# Patient Record
Sex: FEMALE | Race: BLACK OR AFRICAN AMERICAN | NOT HISPANIC OR LATINO | Employment: OTHER | URBAN - METROPOLITAN AREA
[De-identification: names, ages, dates, MRNs, and addresses within clinical notes are randomized per-mention and may not be internally consistent; named-entity substitution may affect disease eponyms.]

---

## 2019-05-15 PROBLEM — E55.9 VITAMIN D DEFICIENCY: Status: ACTIVE | Noted: 2018-04-13

## 2019-05-15 PROBLEM — I51.9 DYSFUNCTION OF RIGHT CARDIAC VENTRICLE: Status: ACTIVE | Noted: 2019-05-15

## 2019-05-15 PROBLEM — E66.01 MORBID OBESITY DUE TO EXCESS CALORIES: Status: ACTIVE | Noted: 2017-08-30

## 2019-05-15 PROBLEM — S43.422A SPRAIN OF LEFT ROTATOR CUFF CAPSULE: Status: ACTIVE | Noted: 2019-05-15

## 2019-05-15 PROBLEM — E78.2 MIXED HYPERLIPIDEMIA: Status: ACTIVE | Noted: 2018-03-28

## 2019-05-15 PROBLEM — E11.9 TYPE 2 DIABETES MELLITUS WITHOUT COMPLICATION: Status: ACTIVE | Noted: 2017-08-30

## 2019-05-15 PROBLEM — M79.7 FIBROMYALGIA: Status: ACTIVE | Noted: 2017-08-30

## 2019-05-15 PROBLEM — F31.81 BIPOLAR 2 DISORDER, MAJOR DEPRESSIVE EPISODE: Status: ACTIVE | Noted: 2018-03-28

## 2019-05-15 PROBLEM — I10 ESSENTIAL HYPERTENSION: Status: ACTIVE | Noted: 2017-08-30

## 2019-05-15 PROBLEM — M79.7 FIBROMYALGIA: Chronic | Status: ACTIVE | Noted: 2017-08-30

## 2019-05-16 PROBLEM — F31.81 BIPOLAR 2 DISORDER, MAJOR DEPRESSIVE EPISODE: Chronic | Status: ACTIVE | Noted: 2018-03-28

## 2019-05-16 PROBLEM — I51.9 DYSFUNCTION OF RIGHT CARDIAC VENTRICLE: Chronic | Status: ACTIVE | Noted: 2019-05-15

## 2019-05-16 PROBLEM — E11.9 TYPE 2 DIABETES MELLITUS WITHOUT COMPLICATION: Chronic | Status: ACTIVE | Noted: 2017-08-30

## 2019-05-16 PROBLEM — E66.01 MORBID OBESITY DUE TO EXCESS CALORIES: Chronic | Status: ACTIVE | Noted: 2017-08-30

## 2019-05-16 PROBLEM — I10 ESSENTIAL HYPERTENSION: Chronic | Status: ACTIVE | Noted: 2017-08-30

## 2019-05-16 PROBLEM — E78.2 MIXED HYPERLIPIDEMIA: Chronic | Status: ACTIVE | Noted: 2018-03-28

## 2019-05-16 PROBLEM — E55.9 VITAMIN D DEFICIENCY: Chronic | Status: ACTIVE | Noted: 2018-04-13

## 2019-08-29 PROBLEM — J20.9 BRONCHITIS WITH BRONCHOSPASM: Status: ACTIVE | Noted: 2019-08-29

## 2019-10-09 PROBLEM — Z79.4 TYPE 2 DIABETES MELLITUS WITHOUT COMPLICATION, WITH LONG-TERM CURRENT USE OF INSULIN: Status: ACTIVE | Noted: 2017-08-30

## 2019-12-04 PROBLEM — E11.40 TYPE 2 DIABETES MELLITUS WITH DIABETIC NEUROPATHY, WITH LONG-TERM CURRENT USE OF INSULIN: Status: ACTIVE | Noted: 2017-08-30

## 2020-02-25 PROBLEM — G47.33 OBSTRUCTIVE SLEEP APNEA: Status: ACTIVE | Noted: 2020-02-25

## 2020-02-27 PROBLEM — F60.3 BORDERLINE PERSONALITY DISORDER: Status: ACTIVE | Noted: 2020-02-27

## 2020-02-28 PROBLEM — N39.0 UTI (URINARY TRACT INFECTION): Status: ACTIVE | Noted: 2020-02-28

## 2020-03-18 PROBLEM — S43.422A SPRAIN OF LEFT ROTATOR CUFF CAPSULE: Chronic | Status: ACTIVE | Noted: 2019-05-15

## 2020-03-18 PROBLEM — E11.40 TYPE 2 DIABETES MELLITUS WITH DIABETIC NEUROPATHY, WITH LONG-TERM CURRENT USE OF INSULIN: Chronic | Status: ACTIVE | Noted: 2017-08-30

## 2020-03-18 PROBLEM — G47.33 OBSTRUCTIVE SLEEP APNEA: Chronic | Status: ACTIVE | Noted: 2020-02-25

## 2020-03-18 PROBLEM — Z79.4 TYPE 2 DIABETES MELLITUS WITH DIABETIC NEUROPATHY, WITH LONG-TERM CURRENT USE OF INSULIN: Chronic | Status: ACTIVE | Noted: 2017-08-30

## 2020-03-18 PROBLEM — F60.3 BORDERLINE PERSONALITY DISORDER: Chronic | Status: ACTIVE | Noted: 2020-02-27

## 2020-03-18 PROBLEM — J20.9 BRONCHITIS WITH BRONCHOSPASM: Chronic | Status: ACTIVE | Noted: 2019-08-29

## 2020-04-29 PROBLEM — M12.812 ROTATOR CUFF ARTHROPATHY OF BOTH SHOULDERS: Chronic | Status: ACTIVE | Noted: 2020-04-29

## 2020-04-29 PROBLEM — M12.811 ROTATOR CUFF ARTHROPATHY OF BOTH SHOULDERS: Chronic | Status: ACTIVE | Noted: 2020-04-29

## 2020-04-29 PROBLEM — S43.422A SPRAIN OF LEFT ROTATOR CUFF CAPSULE: Chronic | Status: RESOLVED | Noted: 2019-05-15 | Resolved: 2020-04-29

## 2020-09-23 PROBLEM — G47.09 OTHER INSOMNIA: Status: ACTIVE | Noted: 2020-09-23

## 2021-02-01 PROBLEM — N95.1 HOT FLASHES DUE TO MENOPAUSE: Status: ACTIVE | Noted: 2021-02-01

## 2021-03-27 PROBLEM — I21.4 NSTEMI (NON-ST ELEVATED MYOCARDIAL INFARCTION): Status: ACTIVE | Noted: 2021-03-27

## 2021-03-27 PROBLEM — I26.99 PE (PULMONARY THROMBOEMBOLISM): Status: ACTIVE | Noted: 2017-10-09

## 2021-09-07 PROBLEM — K13.79 MOUTH SORE: Status: ACTIVE | Noted: 2021-09-07

## 2021-12-13 PROBLEM — J06.9 URI WITH COUGH AND CONGESTION: Status: ACTIVE | Noted: 2021-12-13

## 2022-06-07 PROBLEM — R06.02 SOB (SHORTNESS OF BREATH): Status: ACTIVE | Noted: 2022-06-07

## 2022-06-07 PROBLEM — R06.2 WHEEZING: Status: ACTIVE | Noted: 2022-06-07

## 2022-07-26 DIAGNOSIS — U07.1 COVID-19 VIRUS DETECTED: ICD-10-CM

## 2022-07-27 ENCOUNTER — NURSE TRIAGE (OUTPATIENT)
Dept: ADMINISTRATIVE | Facility: CLINIC | Age: 57
End: 2022-07-27
Payer: MEDICARE

## 2022-07-27 NOTE — TELEPHONE ENCOUNTER
Pt responded to [facility] text message pt called x2 with no contact made. VM left    Reason for Disposition   Message left on identified voicemail    Protocols used: NO CONTACT OR DUPLICATE CONTACT CALL-A-[place]

## 2022-08-11 PROBLEM — U07.1 COVID: Status: ACTIVE | Noted: 2022-08-11

## 2022-09-09 PROBLEM — G47.30 SLEEP-DISORDERED BREATHING: Status: ACTIVE | Noted: 2022-09-09

## 2022-11-22 ENCOUNTER — PATIENT OUTREACH (OUTPATIENT)
Dept: ADMINISTRATIVE | Facility: HOSPITAL | Age: 57
End: 2022-11-22
Payer: MEDICARE
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
## 2023-01-04 PROBLEM — G24.01 TARDIVE DYSKINESIA: Status: ACTIVE | Noted: 2023-01-04

## 2023-01-11 DIAGNOSIS — E11.9 TYPE 2 DIABETES MELLITUS WITHOUT COMPLICATION: ICD-10-CM

## 2023-01-13 ENCOUNTER — PATIENT MESSAGE (OUTPATIENT)
Dept: ADMINISTRATIVE | Facility: HOSPITAL | Age: 58
End: 2023-01-13

## 2023-03-29 ENCOUNTER — PATIENT OUTREACH (OUTPATIENT)
Dept: ADMINISTRATIVE | Facility: HOSPITAL | Age: 58
End: 2023-03-29

## 2023-03-29 PROBLEM — R25.2 MUSCLE CRAMPS: Status: ACTIVE | Noted: 2018-09-05

## 2023-03-29 PROBLEM — R42 LIGHTHEADEDNESS: Status: ACTIVE | Noted: 2023-03-29

## 2023-03-29 PROBLEM — M62.81 MUSCLE WEAKNESS: Status: ACTIVE | Noted: 2023-03-29

## 2023-03-29 PROBLEM — N95.1 MENOPAUSAL SYNDROME (HOT FLASHES): Status: ACTIVE | Noted: 2018-09-05

## 2023-03-29 PROBLEM — R33.9 URINARY RETENTION: Status: ACTIVE | Noted: 2023-03-29

## 2023-03-29 PROBLEM — G25.81 RESTLESS LEG: Status: ACTIVE | Noted: 2018-11-28

## 2023-03-29 PROBLEM — R06.00 DYSPNEA: Status: ACTIVE | Noted: 2017-08-30

## 2023-03-29 RX ORDER — CEFUROXIME AXETIL 250 MG/1
250 TABLET ORAL 2 TIMES DAILY
COMMUNITY
Start: 2023-03-07 | End: 2023-10-25 | Stop reason: ALTCHOICE

## 2023-04-03 ENCOUNTER — PATIENT OUTREACH (OUTPATIENT)
Dept: ADMINISTRATIVE | Facility: HOSPITAL | Age: 58
End: 2023-04-03

## 2023-04-05 ENCOUNTER — SOCIAL WORK (OUTPATIENT)
Dept: ADMINISTRATIVE | Facility: OTHER | Age: 58
End: 2023-04-05

## 2023-04-05 NOTE — PROGRESS NOTES
Sw received a referral for PA of Ingrezza 40mg. PA submitted through [employer]. Awaiting authorization.    Jayna Begum LCSW    [phone]

## 2023-04-05 NOTE — PROGRESS NOTES
Sandra received a PA approval of Ingrezza 40mg. Sw called [facility] ([phone]) and spoke to the pharmacist. When she processed a claim through insurance, it had a co-pay of $1100. This is due to the medicine being in a different tier class than other medicine. Sandra messaged Dr. Saucedo regarding the cost of the medicine. Sandra then called and spoke to Patient ([phone]). Sw explained the insurance approved the Ingrezza. However, the co-pay is $1100. The physician will call her to discuss different options. She verbalized understanding.    Jayna Begum LCSW    [phone]

## 2023-04-27 PROBLEM — S46.912A STRAIN OF LEFT SHOULDER: Status: ACTIVE | Noted: 2023-04-27

## 2023-04-27 PROBLEM — E11.65 UNCONTROLLED TYPE 2 DIABETES MELLITUS WITH HYPERGLYCEMIA: Status: ACTIVE | Noted: 2023-04-27

## 2023-05-10 ENCOUNTER — PATIENT OUTREACH (OUTPATIENT)
Dept: ADMINISTRATIVE | Facility: HOSPITAL | Age: 58
End: 2023-05-10

## 2023-06-15 PROBLEM — J45.901 EXACERBATION OF ASTHMA: Status: ACTIVE | Noted: 2023-06-15

## 2023-06-20 ENCOUNTER — SOCIAL WORK (OUTPATIENT)
Dept: ADMINISTRATIVE | Facility: OTHER | Age: 58
End: 2023-06-20

## 2023-06-20 NOTE — PROGRESS NOTES
Sw received a referral to assist with PA of Ingrezza 40mg. PA submitted through [employer]. Results stated one has already been submitted. Sandra messaged Dr. Saucedo regarding the PA request.    Jayna Begum LCSW    [phone]

## 2023-06-22 ENCOUNTER — SOCIAL WORK (OUTPATIENT)
Dept: ADMINISTRATIVE | Facility: OTHER | Age: 58
End: 2023-06-22

## 2023-06-22 NOTE — PROGRESS NOTES
Sandra spoke to Dr. Saucedo regarding the PA for Ingrezza. He reports he did not initiate the PA. Sandra called [employer]  ([phone]) and spoke to a rep. She reports a fax was sent to the physician for completion. As the form was not received, a new PA request form will be faxed for physician to complete.    Jayna Begum LCSW    [phone]

## 2023-06-22 NOTE — PROGRESS NOTES
Sandra faxed completed and signed form for the PA of Ingrezza 40mg to [employer] for review.    Jayna Begum LCSW    [phone]

## 2023-06-26 ENCOUNTER — PATIENT MESSAGE (OUTPATIENT)
Dept: ADMINISTRATIVE | Facility: OTHER | Age: 58
End: 2023-06-26

## 2023-06-29 ENCOUNTER — PATIENT OUTREACH (OUTPATIENT)
Dept: ADMINISTRATIVE | Facility: HOSPITAL | Age: 58
End: 2023-06-29

## 2023-07-12 PROBLEM — F31.75 BIPOLAR I DISORDER, CURRENT OR MOST RECENT EPISODE DEPRESSED, IN PARTIAL REMISSION: Status: ACTIVE | Noted: 2018-03-28

## 2023-08-03 ENCOUNTER — PATIENT OUTREACH (OUTPATIENT)
Dept: ADMINISTRATIVE | Facility: HOSPITAL | Age: 58
End: 2023-08-03

## 2023-08-03 DIAGNOSIS — E11.9 TYPE 2 DIABETES MELLITUS WITHOUT COMPLICATION, WITHOUT LONG-TERM CURRENT USE OF INSULIN: Primary | ICD-10-CM

## 2023-08-15 ENCOUNTER — PATIENT OUTREACH (OUTPATIENT)
Dept: ADMINISTRATIVE | Facility: HOSPITAL | Age: 58
End: 2023-08-15

## 2023-08-15 DIAGNOSIS — E11.9 TYPE 2 DIABETES MELLITUS WITHOUT COMPLICATION, WITHOUT LONG-TERM CURRENT USE OF INSULIN: Primary | ICD-10-CM

## 2024-02-13 ENCOUNTER — SOCIAL WORK (OUTPATIENT)
Dept: ADMINISTRATIVE | Facility: OTHER | Age: 59
End: 2024-02-13

## 2024-02-13 NOTE — PROGRESS NOTES
Sw received a consult to assist with counseling services. Sw called and spoke to Patient ([phone]). She is agreeable to counseling. Sandra faxed a referral to [facility] to review.    Jayna Begum LCSW    [phone]

## 2024-02-15 ENCOUNTER — SOCIAL WORK (OUTPATIENT)
Dept: ADMINISTRATIVE | Facility: OTHER | Age: 59
End: 2024-02-15

## 2024-02-15 NOTE — PROGRESS NOTES
Sandra received a phone call from Lili at [facility]. She was needing Patient's phone number. It was not with the referral packet. Sandra provided her the phone number. Lili will give Patient a call to schedule an assessment.    [facility]  [address]  [address]  [place], [place]  [phone]    Jayna Begum LCSW    [phone]

## 2024-03-14 ENCOUNTER — PATIENT OUTREACH (OUTPATIENT)
Dept: ADMINISTRATIVE | Facility: HOSPITAL | Age: 59
End: 2024-03-14

## 2024-03-14 DIAGNOSIS — E11.9 TYPE 2 DIABETES MELLITUS WITHOUT COMPLICATION, WITHOUT LONG-TERM CURRENT USE OF INSULIN: Primary | ICD-10-CM
